# Patient Record
Sex: MALE | Race: WHITE | NOT HISPANIC OR LATINO | URBAN - METROPOLITAN AREA
[De-identification: names, ages, dates, MRNs, and addresses within clinical notes are randomized per-mention and may not be internally consistent; named-entity substitution may affect disease eponyms.]

---

## 2020-11-22 ENCOUNTER — OUTPATIENT (OUTPATIENT)
Dept: OUTPATIENT SERVICES | Facility: HOSPITAL | Age: 25
LOS: 1 days | End: 2020-11-22
Payer: COMMERCIAL

## 2020-11-22 DIAGNOSIS — Z11.59 ENCOUNTER FOR SCREENING FOR OTHER VIRAL DISEASES: ICD-10-CM

## 2020-11-22 DIAGNOSIS — M26.02 MAXILLARY HYPOPLASIA: Chronic | ICD-10-CM

## 2020-11-22 LAB — SARS-COV-2 RNA SPEC QL NAA+PROBE: SIGNIFICANT CHANGE UP

## 2020-11-22 PROCEDURE — U0003: CPT

## 2020-11-24 ENCOUNTER — OUTPATIENT (OUTPATIENT)
Dept: OUTPATIENT SERVICES | Facility: HOSPITAL | Age: 25
LOS: 1 days | End: 2020-11-24
Payer: COMMERCIAL

## 2020-11-24 DIAGNOSIS — M26.02 MAXILLARY HYPOPLASIA: Chronic | ICD-10-CM

## 2020-11-24 PROCEDURE — 82962 GLUCOSE BLOOD TEST: CPT

## 2020-11-25 DIAGNOSIS — E11.3519 TYPE 2 DIABETES MELLITUS WITH PROLIFERATIVE DIABETIC RETINOPATHY WITH MACULAR EDEMA, UNSPECIFIED EYE: ICD-10-CM

## 2021-03-25 PROBLEM — U07.1 COVID-19: Chronic | Status: ACTIVE | Noted: 2020-11-24

## 2021-03-30 ENCOUNTER — OUTPATIENT (OUTPATIENT)
Dept: OUTPATIENT SERVICES | Facility: HOSPITAL | Age: 26
LOS: 1 days | End: 2021-03-30
Payer: COMMERCIAL

## 2021-03-30 DIAGNOSIS — M26.02 MAXILLARY HYPOPLASIA: Chronic | ICD-10-CM

## 2021-03-30 DIAGNOSIS — Z20.828 CONTACT WITH AND (SUSPECTED) EXPOSURE TO OTHER VIRAL COMMUNICABLE DISEASES: ICD-10-CM

## 2021-03-30 LAB — SARS-COV-2 RNA SPEC QL NAA+PROBE: SIGNIFICANT CHANGE UP

## 2021-03-30 PROCEDURE — U0005: CPT

## 2021-03-30 PROCEDURE — U0003: CPT

## 2021-04-01 ENCOUNTER — OUTPATIENT (OUTPATIENT)
Dept: OUTPATIENT SERVICES | Facility: HOSPITAL | Age: 26
LOS: 1 days | End: 2021-04-01
Payer: COMMERCIAL

## 2021-04-01 VITALS
DIASTOLIC BLOOD PRESSURE: 89 MMHG | SYSTOLIC BLOOD PRESSURE: 164 MMHG | HEART RATE: 100 BPM | OXYGEN SATURATION: 100 % | RESPIRATION RATE: 20 BRPM

## 2021-04-01 VITALS
WEIGHT: 291.01 LBS | HEART RATE: 110 BPM | TEMPERATURE: 98 F | SYSTOLIC BLOOD PRESSURE: 146 MMHG | RESPIRATION RATE: 20 BRPM | HEIGHT: 70 IN | DIASTOLIC BLOOD PRESSURE: 83 MMHG | OXYGEN SATURATION: 96 %

## 2021-04-01 DIAGNOSIS — M26.02 MAXILLARY HYPOPLASIA: Chronic | ICD-10-CM

## 2021-04-01 DIAGNOSIS — E11.3511 TYPE 2 DIABETES MELLITUS WITH PROLIFERATIVE DIABETIC RETINOPATHY WITH MACULAR EDEMA, RIGHT EYE: ICD-10-CM

## 2021-04-01 LAB — GLUCOSE BLDC GLUCOMTR-MCNC: 313 MG/DL — HIGH (ref 70–99)

## 2021-04-01 PROCEDURE — 82962 GLUCOSE BLOOD TEST: CPT

## 2021-04-01 PROCEDURE — 67113 REPAIR RETINAL DETACH CPLX: CPT | Mod: AS

## 2021-04-01 PROCEDURE — C1889: CPT

## 2021-04-01 PROCEDURE — 67113 REPAIR RETINAL DETACH CPLX: CPT | Mod: LT

## 2021-04-01 NOTE — ASU PATIENT PROFILE, ADULT - PMH
Asthma    COVID-19  May 2020  Diabetes mellitus  Type II  History of hypertension    Morbid obesity with BMI of 45.0-49.9, adult    Sleep apnea

## 2021-04-01 NOTE — ASU DISCHARGE PLAN (ADULT/PEDIATRIC) - CALL YOUR DOCTOR IF YOU HAVE ANY OF THE FOLLOWING:
Bleeding that does not stop Bleeding that does not stop/Swelling that gets worse/Pain not relieved by Medications/Fever greater than (need to indicate Fahrenheit or Celsius)/Nausea and vomiting that does not stop/Inability to tolerate liquids or foods/Increased irritability or sluggishness

## 2021-04-01 NOTE — ASU DISCHARGE PLAN (ADULT/PEDIATRIC) - ASU DC SPECIAL INSTRUCTIONSFT
Keep head down 90% of awake time, sleep on the right side down  follow up with Dr. Antunez on 4/2/21 at 10 Am at [address], [address], [place], [place]

## 2021-04-01 NOTE — ASU PATIENT PROFILE, ADULT - VISION (WITH CORRECTIVE LENSES IF THE PATIENT USUALLY WEARS THEM):
b/l vision blurry/Partially impaired: cannot see medication labels or newsprint, but can see obstacles in path, and the surrounding layout; can count fingers at arm's length

## 2021-04-01 NOTE — ASU DISCHARGE PLAN (ADULT/PEDIATRIC) - CARE PROVIDER_API CALL
Dayami Antunez)  Ophthalmology  [address], [address]  [place] [address]  Phone: [phone]  Fax: [phone]  Follow Up Time: